# Patient Record
Sex: MALE | Race: WHITE | NOT HISPANIC OR LATINO | URBAN - METROPOLITAN AREA
[De-identification: names, ages, dates, MRNs, and addresses within clinical notes are randomized per-mention and may not be internally consistent; named-entity substitution may affect disease eponyms.]

---

## 2025-05-08 ENCOUNTER — OFFICE VISIT (OUTPATIENT)
Dept: URGENT CARE | Age: 65
End: 2025-05-08
Payer: COMMERCIAL

## 2025-05-08 VITALS
TEMPERATURE: 98.6 F | DIASTOLIC BLOOD PRESSURE: 77 MMHG | HEART RATE: 71 BPM | SYSTOLIC BLOOD PRESSURE: 146 MMHG | RESPIRATION RATE: 16 BRPM | OXYGEN SATURATION: 95 %

## 2025-05-08 DIAGNOSIS — H66.92 LEFT OTITIS MEDIA, UNSPECIFIED OTITIS MEDIA TYPE: ICD-10-CM

## 2025-05-08 DIAGNOSIS — J02.9 SORE THROAT: Primary | ICD-10-CM

## 2025-05-08 LAB
POC HUMAN RHINOVIRUS PCR: NEGATIVE
POC INFLUENZA A VIRUS PCR: NEGATIVE
POC INFLUENZA B VIRUS PCR: NEGATIVE
POC RESPIRATORY SYNCYTIAL VIRUS PCR: NEGATIVE
POC STREPTOCOCCUS PYOGENES (GROUP A STREP) PCR: NEGATIVE

## 2025-05-08 RX ORDER — AZITHROMYCIN 250 MG/1
250 TABLET, FILM COATED ORAL DAILY
Qty: 6 TABLET | Refills: 0 | Status: SHIPPED | OUTPATIENT
Start: 2025-05-08 | End: 2025-05-13

## 2025-05-08 ASSESSMENT — ENCOUNTER SYMPTOMS
BACK PAIN: 1
SORE THROAT: 1
COUGH: 1
HEADACHES: 1

## 2025-05-08 NOTE — PROGRESS NOTES
Subjective   Patient ID: Monica Nicole is a 64 y.o. male. They present today with a chief complaint of Cough, Sore Throat, Headache, Generalized Body Aches, Back Pain, and Nasal Congestion (Pt presents with above symptoms for the past week. ).    History of Present Illness    Cough  Associated symptoms include headaches and a sore throat.   Sore Throat   Associated symptoms include coughing and headaches.   Headache  Associated symptoms: back pain, cough and sore throat    Back Pain  Associated symptoms include headaches.       64-year-old male patient presents today with concerns of dry cough, sore throat, headache, generalized bodyaches, nasal congestion for about 1 week.  He endorses that he has been taking over-the-counter ibuprofen, which does seem to help his symptoms, but he has had no improvement of his symptoms with this.  He denies any abdominal pain, nausea, diarrhea, constipation.  He is here for evaluation.    Past Medical History  Allergies as of 05/08/2025 - Reviewed 05/08/2025   Allergen Reaction Noted    Morphine Unknown 05/08/2025       Prescriptions Prior to Admission[1]     Medical History[2]    Surgical History[3]     reports that he has never smoked. He has never been exposed to tobacco smoke. He has never used smokeless tobacco. He reports that he does not drink alcohol and does not use drugs.    Review of Systems  Review of Systems   HENT:  Positive for sore throat.    Respiratory:  Positive for cough.    Musculoskeletal:  Positive for back pain.        Generalized body aches   Neurological:  Positive for headaches.                                  Objective    Vitals:    05/08/25 1322   BP: 146/77   BP Location: Left arm   Patient Position: Sitting   Pulse: 71   Resp: 16   Temp: 37 °C (98.6 °F)   SpO2: 95%     No LMP for male patient.    Physical Exam  HENT:      Right Ear: A middle ear effusion is present.      Left Ear: A middle ear effusion is present. Tympanic membrane is  erythematous.   Cardiovascular:      Rate and Rhythm: Normal rate and regular rhythm.      Pulses: Normal pulses.      Heart sounds: Normal heart sounds.   Pulmonary:      Effort: Pulmonary effort is normal.      Breath sounds: Normal breath sounds.         Procedures    Point of Care Test & Imaging Results from this visit  No results found for this visit on 05/08/25.   Imaging  No results found.    Cardiology, Vascular, and Other Imaging  No other imaging results found for the past 2 days      Diagnostic study results (if any) were reviewed by TAYLOR Smith.    Assessment/Plan   Allergies, medications, history, and pertinent labs/EKGs/Imaging reviewed by TAYLOR Smith.     Medical Decision Making  Spotfire strep completed in clinic: negative.  Based on patient's presenting symptoms and physical examination, I suspect that his left ear infection.  He is agreeable to an antibiotic to be sent to his pharmacy.  We discussed taking a daily antihistamine and Tylenol/ibuprofen to help with symptoms. As a result of the work-up, the patient was discharged home.  he was informed of his diagnosis and instructed to come back with any concerns or worsening of condition.  he and was agreeable to the plan as discussed above.  he was given the opportunity to ask questions.  All of the patient's questions were answered.      Orders and Diagnoses  Diagnoses and all orders for this visit:  Sore throat  -     POCT SPOTFIRE R/ST Panel Mini w/Strep A ([facility]) manually resulted  Left otitis media, unspecified otitis media type  -     azithromycin (Zithromax) 250 mg tablet; Take 1 tablet (250 mg) by mouth once daily for 5 days. Take 2 tabs (500 mg) by mouth today, then take 1 tab daily for 4 days.      Medical Admin Record      Patient disposition: Home    Electronically signed by TAYLOR Smith  1:52 PM           [1] (Not in a hospital admission)   [2]   Past Medical  History:  Diagnosis Date    Fracture of unspecified part of scapula, unspecified shoulder, initial encounter for closed fracture     Scapular fracture    Personal history of other diseases of the respiratory system 05/10/2017    History of acute bronchitis   [3]   Past Surgical History:  Procedure Laterality Date    GALLBLADDER SURGERY  05/10/2017    Gallbladder Surgery